# Patient Record
Sex: MALE | Race: AMERICAN INDIAN OR ALASKA NATIVE
[De-identification: names, ages, dates, MRNs, and addresses within clinical notes are randomized per-mention and may not be internally consistent; named-entity substitution may affect disease eponyms.]

---

## 2018-08-29 ENCOUNTER — HOSPITAL ENCOUNTER (OUTPATIENT)
Dept: HOSPITAL 31 - C.ER | Age: 48
Setting detail: OBSERVATION
LOS: 2 days | Discharge: LEFT BEFORE DISCHARGE | End: 2018-08-31
Payer: MEDICARE

## 2018-08-29 DIAGNOSIS — I69.954: ICD-10-CM

## 2018-08-29 DIAGNOSIS — E11.22: ICD-10-CM

## 2018-08-29 DIAGNOSIS — Z91.19: ICD-10-CM

## 2018-08-29 DIAGNOSIS — I12.0: Primary | ICD-10-CM

## 2018-08-29 DIAGNOSIS — N18.6: ICD-10-CM

## 2018-08-29 DIAGNOSIS — D64.9: ICD-10-CM

## 2018-08-29 DIAGNOSIS — K21.9: ICD-10-CM

## 2018-08-29 DIAGNOSIS — Z99.2: ICD-10-CM

## 2018-08-29 LAB
ALBUMIN SERPL-MCNC: 4.3 [, G/DL] (ref 3.5–5)
ALBUMIN/GLOB SERPL: 1.5 [,] (ref 1–2.1)
ALT SERPL-CCNC: 23 [, U/L] (ref 21–72)
AST SERPL-CCNC: 33 [, U/L] (ref 17–59)
BASOPHILS # BLD AUTO: 0 [, K/UL] (ref 0–0.2)
BASOPHILS NFR BLD: 0.9 [, %] (ref 0–2)
BUN SERPL-MCNC: 94 [, MG/DL] (ref 9–20)
CALCIUM SERPL-MCNC: 6.9 [, MG/DL] (ref 8.6–10.4)
EOSINOPHIL # BLD AUTO: 0.1 [, K/UL] (ref 0–0.7)
EOSINOPHIL NFR BLD: 2.5 [, %] (ref 0–4)
ERYTHROCYTE [DISTWIDTH] IN BLOOD BY AUTOMATED COUNT: 16.5 [, %] (ref 11.5–14.5)
GFR NON-AFRICAN AMERICAN: 3 [,]
HGB BLD-MCNC: 9.9 [, G/DL] (ref 12–18)
LYMPHOCYTES # BLD AUTO: 0.6 [, K/UL] (ref 1–4.3)
LYMPHOCYTES NFR BLD AUTO: 10.2 [, %] (ref 20–40)
MCH RBC QN AUTO: 31.5 [, PG] (ref 27–31)
MCHC RBC AUTO-ENTMCNC: 32.7 [, G/DL] (ref 33–37)
MCV RBC AUTO: 96.2 [, FL] (ref 80–94)
MONOCYTES # BLD: 0.4 [, K/UL] (ref 0–0.8)
MONOCYTES NFR BLD: 7.7 [, %] (ref 0–10)
NEUTROPHILS # BLD: 4.4 [, K/UL] (ref 1.8–7)
NEUTROPHILS NFR BLD AUTO: 78.7 [, %] (ref 50–75)
NRBC BLD AUTO-RTO: 0 [, %] (ref 0–2)
PLATELET # BLD: 176 [, K/UL] (ref 130–400)
PMV BLD AUTO: 8.4 [, FL] (ref 7.2–11.7)
RBC # BLD AUTO: 3.16 [, MIL/UL] (ref 4.4–5.9)
WBC # BLD AUTO: 5.7 [, K/UL] (ref 4.8–10.8)

## 2018-08-29 PROCEDURE — 80061 LIPID PANEL: CPT

## 2018-08-29 PROCEDURE — 83970 ASSAY OF PARATHORMONE: CPT

## 2018-08-29 PROCEDURE — 85025 COMPLETE CBC W/AUTO DIFF WBC: CPT

## 2018-08-29 PROCEDURE — 85027 COMPLETE CBC AUTOMATED: CPT

## 2018-08-29 PROCEDURE — 87040 BLOOD CULTURE FOR BACTERIA: CPT

## 2018-08-29 PROCEDURE — 80048 BASIC METABOLIC PNL TOTAL CA: CPT

## 2018-08-29 PROCEDURE — 36415 COLL VENOUS BLD VENIPUNCTURE: CPT

## 2018-08-29 PROCEDURE — 99285 EMERGENCY DEPT VISIT HI MDM: CPT

## 2018-08-29 PROCEDURE — 87340 HEPATITIS B SURFACE AG IA: CPT

## 2018-08-29 PROCEDURE — 71045 X-RAY EXAM CHEST 1 VIEW: CPT

## 2018-08-29 PROCEDURE — 84443 ASSAY THYROID STIM HORMONE: CPT

## 2018-08-29 PROCEDURE — 82607 VITAMIN B-12: CPT

## 2018-08-29 PROCEDURE — 82746 ASSAY OF FOLIC ACID SERUM: CPT

## 2018-08-29 PROCEDURE — 93971 EXTREMITY STUDY: CPT

## 2018-08-29 PROCEDURE — 85730 THROMBOPLASTIN TIME PARTIAL: CPT

## 2018-08-29 PROCEDURE — 83550 IRON BINDING TEST: CPT

## 2018-08-29 PROCEDURE — 71275 CT ANGIOGRAPHY CHEST: CPT

## 2018-08-29 PROCEDURE — 93005 ELECTROCARDIOGRAM TRACING: CPT

## 2018-08-29 PROCEDURE — 80053 COMPREHEN METABOLIC PANEL: CPT

## 2018-08-29 PROCEDURE — 80076 HEPATIC FUNCTION PANEL: CPT

## 2018-08-29 PROCEDURE — 83540 ASSAY OF IRON: CPT

## 2018-08-29 PROCEDURE — 84100 ASSAY OF PHOSPHORUS: CPT

## 2018-08-29 PROCEDURE — 36901 INTRO CATH DIALYSIS CIRCUIT: CPT

## 2018-08-29 PROCEDURE — 85610 PROTHROMBIN TIME: CPT

## 2018-08-29 PROCEDURE — 83036 HEMOGLOBIN GLYCOSYLATED A1C: CPT

## 2018-08-29 NOTE — C.PDOC
History Of Present Illness


47 y/o male with history of DM and HTN, on dialysis presents to ED with c/o 

left arm swelling radiating to left chest for 2 days. Patient state he missed 

dialysis yesterday and went to see Dr. Black today for medication refill and 

was sent to ED for further evaluation. Patient denies history of injury, sob, 

chest pain or any other complaints at this time.  


Time Seen by Provider: 08/29/18 16:58


Chief Complaint (Nursing): Upper Extremity Problem/Injury


History Per: Patient


History/Exam Limitations: no limitations


Onset/Duration Of Symptoms: Days


Current Symptoms Are (Timing): Still Present


Quality: Aching





Past Medical History


Reviewed: Historical Data, Nursing Documentation, Vital Signs


Vital Signs: 


 Last Vital Signs











Temp  98.1 F   08/29/18 16:46


 


Pulse  85   08/29/18 21:47


 


Resp  20   08/29/18 21:47


 


BP  176/98 H  08/29/18 21:47


 


Pulse Ox  98   08/29/18 21:47














- Medical History


PMH: Diabetes, HTN, End Stage Renal Disease


Other Surgeries: left AV fistula


Family History: States: No Known Family Hx





- Social History


Hx Alcohol Use: No


Hx Substance Use: No





- Immunization History


Hx Tetanus Toxoid Vaccination: No


Hx Influenza Vaccination: No


Hx Pneumococcal Vaccination: No





Review Of Systems


Constitutional: Negative for: Fever, Chills


Cardiovascular: Negative for: Chest Pain


Respiratory: Negative for: Cough, Shortness of Breath


Musculoskeletal: Positive for: Arm Pain (swelling), Other (Sweling left breast)


Skin: Negative for: Rash





Physical Exam





- Physical Exam


Appears: Non-toxic, No Acute Distress


Skin: Warm, Dry, No Rash


Head: Atraumatic, Normacephalic


Eye(s): bilateral: Normal Inspection


Oral Mucosa: Moist


Neck: Normal ROM, Other (venous engorgement to left side of neck)


Chest: No Ecchymosis, Other (left breast swelling)


Cardiovascular: Rhythm Regular


Respiratory: Normal Breath Sounds, No Rales, No Rhonchi, No Wheezing


Gastrointestinal/Abdominal: Soft, No Tenderness, No Guarding, No Rebound


Extremity: Capillary Refill (<2 seconds), No Deformity, Swelling (left arm 

diffusely), Other (fistula on left arm with +thrill)


Extremity: Bilateral: Normal ROM


Neurological/Psych: Oriented x3, Normal Motor, Normal Sensation





ED Course And Treatment





- Laboratory Results


Result Diagrams: 


 08/29/18 17:22





 08/29/18 17:22


Lab Interpretation: Abnormal (Hgb 9.9, BUN 94. Cr 15.2, K+ 4.8)


O2 Sat by Pulse Oximetry: 98 (RA)


Pulse Ox Interpretation: Normal





- CT Scan/US


  ** CTA Chest


Other Rad Studies (CT/US): Read By Radiologist, Radiology Report Reviewed


CT/US Interpretation: FINDINGS:  Pulmonary arteries: Unremarkable. No pulmonary 

embolism.  Aorta: No acute findings. No thoracic aortic aneurysm.  Other veins: 

Left brachiocephalic vein metallic stent. The right tip of the stent appears 

compressed.  The brachiocephalic vein is not clearly visualized distal to the 

stent.  Lungs: Mild interstitial infiltrates in each lung which may be due to 

edema.  Linear atelectasis or fibrosis left lung base.  No mass.  Pleural space

: Unremarkable. No significant effusion. No pneumothorax.  Heart: Coronary 

artery calcifications.  No significant pericardial effusion. No evidence of RV 

dysfunction.  Bones/joints: No acute fracture. No dislocation.  Soft tissues: 

Diffuse subcutaneous edema consistent with anasarca.  Lymph nodes: 

Unremarkable. No enlarged lymph nodes.  IMPRESSION:  1. Diffuse subcutaneous 

edema consistent with anasarca.  2. Left brachiocephalic vein metallic stent. 

The right tip of the stent appears compressed. The.  brachiocephalic vein is 

not clearly visualized distal to the stent. Cannot exclude occlusion of distal.

  brachiocephalic vein and a catheter venogram may be helpful if indicated.  3. 

Mild interstitial infiltrates in each lung which may be due to edema.





  ** CT Left Upper Extrem


Other Rad Studies (CT/US): Read By Radiologist, Radiology Report Reviewed


CT/US Interpretation: FINDINGS:  Bones/joints: Unremarkable. No acute fracture. 

No dislocation.  Soft tissues: Diffuse subcutaneous edema suggesting anasarca.  

Vasculature: Prominent veins in the left upper extremity consistent with patient

's known fistula.  These veins are patent where visualized. The small veins 

below the elbow are poorly visualized.  Atherosclerotic disease.  IMPRESSION:  

1. Diffuse subcutaneous edema suggesting anasarca.  2. Prominent veins in the 

left upper extremity consistent with patient's known fistula. These veins are.  

patent where visualized. The small veins below the elbow are poorly visualized.


Reevaluation Time: 19:46


Reassessment Condition: Unchanged





- Physician Consult Information


Outcome Of Conversation: 19:13  Paged Dr. Marcelo for nephrology consult. Patient 

will need dialysis today after CT scan is completed.  Dr Hollins agrees to admit 

the patient to her service for evaluation of left arm and chest wall swelling 

and possible DVT.





Disposition





- Disposition


Disposition: HOSPITALIZED


Disposition Time: 22:17


Condition: STABLE





- Clinical Impression


Clinical Impression: 


 ESRD (end stage renal disease) on dialysis, Anasarca








- Scribe Statement


The provider has reviewed the documentation as recorded by the Genevaibluanne Noel





All medical record entries made by the Brody were at my direction and 

personally dictated by me. I have reviewed the chart and agree that the record 

accurately reflects my personal performance of the history, physical exam, 

medical decision making, and the department course for this patient. I have 

also personally directed, reviewed, and agree with the discharge instructions 

and disposition.

## 2018-08-29 NOTE — RAD
Date of service: 



08/29/2018



PROCEDURE:  CHEST RADIOGRAPH, 1 VIEW



HISTORY:

SOB



COMPARISON:

None available.



FINDINGS:



LUNGS:

Prominence of the pulmonary vasculature may be secondary to AP 

technique and/or pulmonary vascular congestion. No focal 

consolidation.



PLEURA:

No pneumothorax or pleural fluid seen.



CARDIOVASCULAR:

Normal.



OSSEOUS STRUCTURES:

No significant abnormalities.



VISUALIZED UPPER ABDOMEN:

Normal.



OTHER FINDINGS:

Probable left brachiocephalic venous stent. 



IMPRESSION:

Prominence of the pulmonary vasculature may be secondary to AP 

technique and/or pulmonary vascular congestion. No focal 

consolidation or pleural effusion.

## 2018-08-30 LAB
% IRON SATURATION: 12 [,] (ref 20–55)
ALBUMIN SERPL-MCNC: 3.6 [, G/DL] (ref 3.5–5)
ALBUMIN/GLOB SERPL: 1.3 [,] (ref 1–2.1)
ALT SERPL-CCNC: 26 [, U/L] (ref 21–72)
APTT BLD: 34 [, SECONDS] (ref 21–34)
AST SERPL-CCNC: 21 [, U/L] (ref 17–59)
BILIRUB DIRECT SERPL-MCNC: 0.5 [, MG/DL] (ref 0–0.4)
BUN SERPL-MCNC: 60 [, MG/DL] (ref 9–20)
CALCIUM SERPL-MCNC: 7.3 [, MG/DL] (ref 8.6–10.4)
FOLATE SERPL-MCNC: 5.6 [, NG/ML]
GFR NON-AFRICAN AMERICAN: 5 [,]
HDLC SERPL-MCNC: 33 [, MG/DL] (ref 30–70)
INR PPP: 1.1 [,]
IRON SERPL-MCNC: 24 [, UG/DL] (ref 49–181)
LDLC SERPL-MCNC: 65 [, MG/DL] (ref 0–129)
PROTHROMBIN TIME: 11.8 [, SECONDS] (ref 9.7–12.2)
TIBC SERPL-MCNC: 200 [, UG/DL] (ref 250–450)
VIT B12 SERPL-MCNC: 577 [, PG/ML] (ref 239–931)

## 2018-08-30 NOTE — CT
Date of service: 



08/29/2018



PROCEDURE:  CT Chest and left upper extremity with contrast (CT 

venography)



HISTORY:

CT venogram for left arm and chest swelling



COMPARISON:

None available.



TECHNIQUE:

Axial computed tomography images were obtained of the chest and left 

upper extremity in the venous phase of enhancement. Coronal and 

sagittal reformatted images were created and reviewed.



Intravenous contrast dose: 100 mL Visipaque 320



Radiation dose:



Total exam DLP = 811.0 mGy-cm.



This CT exam was performed using one or more of the following dose 

reduction techniques: Automated exposure control, adjustment of the 

mA and/or kV according to patient size, and/or use of iterative 

reconstruction technique.



FINDINGS:



LUNGS:

Unremarkable. No nodule, mass or pulmonary consolidation. 



PLEURAL SPACES:

Unremarkable. No effusion or pneumothorax. 



HEART:

Cardiomegaly.  Coronary arterial and valvular calcification. No 

significant pericardial effusion. 



LYMPH NODES:

No lymphadenopathy.



BONES, CHEST WALL:

Diffuse anasarca. No fracture or destructive lesion 



OTHER FINDINGS:

Left upper extremity brachiocephalic fistula. Anastomosis difficult 

to evaluate. Juxta anastomotic outflow vein to indwelling 

brachiocephalic vein stent, widely patent.  Difficult to evaluate 

intra stent patency due to suboptimal opacification. Distal (medial) 

portion of stent appears compressed with limited assessment of the 

brachiocephalic vein distal to the stent. Arteries and veins in the 

forearm and hand are suboptimally assessed, but appear grossly 

patent. 



IMPRESSION:

Left upper extremity brachiocephalic arteriovenous fistula. The 

anastomosis is difficult to evaluate.  The juxta anastomotic outflow 

vein to the indwelling brachycephalic vein stent is widely patent.  

Difficult to evaluate intra stent patency due to suboptimal 

opacification. The distal (medial) portion of the stent appears 

compressed with limited assessment of the brachiocephalic vein distal 

to the stent. Conventional arteriovenous fistulography is recommended 

for better assessment.



Diffuse left upper extremity and left chest wall subcutaneous edema 

consistent with anasarca.

## 2018-08-30 NOTE — HP
date 8/29/18Copied To:  Brii Hollins MD

Attending MD:  Brii Hollins MD



CHIEF COMPLAINT:  Swelling of left upper extremity including left breast.



HISTORY OF PRESENT ILLNESS:  Mr. Paulino Pappas is a 48-year-old male with

history of diabetes mellitus, hypertension, renal insufficiency on

hemodialysis, came with complaining of left upper extremity pain radiating

to the left chest for two days.  The patient states that he missed the

dialysis yesterday and went to Dr. Black today for medication refill and

was presented to ED for further evaluation.  The patient denies history of

anxiety, shortness of breath, chest pain or any other complaints at this

time.  I am trying to get emergency dialysis for the patient this night. 

CAT scan is done by the ER.



PAST MEDICAL HISTORY:  Diabetes mellitus, hypertension, history of renal

disease on hemodialysis, left AV fistula.



FAMILY HISTORY:  Father and mother, noncontributory.



HABITS:  No smoking.  No drug.  No ethanol.



REVIEW OF SYSTEMS:  The patient was seen and examined at bedside.  In the

ER, having swelling of left upper extremity, left breast was swollen and

tender.  No fever.  No chills.  No headaches or dizziness.  No shortness of

breath.  Skin, negative for rash.



PHYSICAL EXAMINATION:

VITAL SIGNS:  Temperature 98.1, pulse 85, respiratory rate 20, blood

pressure 120/98, pulse oximetry 98.

HEENT:  Head, normocephalic and atraumatic.  Eyes, PERRLA.  Extraocular

muscles intact.  Conjunctivae clear.  Nose patent.

NECK:  Supple.  No carotid bruit.  No JVD or thyromegaly.

CHEST:  Bilaterally symmetrical.

HEART:  S1 and S2 positive.

LUNGS:  Clear to auscultation.

ABDOMEN:  Soft.  Bowel sounds present.  No organomegaly.

EXTREMITIES:  Left upper extremity swollen and other lower two extremities

have no swelling.  No cyanosis.

NEUROLOGIC:  The patient is awake and alert.  Follows simple commands.



LABORATORY DATA:  White blood cells 5.7, hemoglobin 9.9, hematocrit 30.4,

platelets 176.  Sodium 142, potassium 4.8, BUN 92, creatinine 15.2, glucose

113.



ASSESSMENT AND PLAN:  Mr. Paulino Pappas is a 48-year-old male with anemia;

acute on chronic renal insufficiency, on hemodialysis, noncompliant, missed

hemodialysis yesterday; history of diabetes mellitus; hypertension; left

arteriovenous fistula; anasarca, noncompliant, urged to be compliant.  Need

angiography.  CT results are pending.  Chest x-ray done, shows prominence

of the pulmonary vasculature, maybe secondary to technique and/or

pulmonary vascular congestion.   additional pleural effusion.  We

arranged immediate emergency dialysis.  The patient is scheduled for

hemodialysis.  As per the nurse, the patient is getting itchy.  I gave

Benadryl.  Blood pressure is high.  She is going to the nephrologist for

blood pressure medications.  Gastrointestinal and deep vein thrombosis

prophylaxis.  Repeat laboratories.  We will follow up.





__________________________________________

Brii Hollins MD





DD:  08/29/2018 23:50:00

DT:  08/30/2018 3:54:49

Job # [id number]

MTDD

## 2018-08-30 NOTE — CP.PCM.CON
History of Present Illness





- History of Present Illness


History of Present Illness: 





Surgery: Dr. Merritt





CC: Swelling LUE





HPI: 48M w. pmh of HTN, DM, ESRD presents with LUE and L side chest swelling x 

3 days. He has pain at the areas when pressure is applied, otherwise no 

complaints. He denies SOB, no CP/palpitations, no numbness/weakness/parethesias 

in the affected extremity. 





PMH: see above


PSH: LUE AVF


Meds: MAR reviewed


NKDA


Social: No ETOH/Tobacco/Drugs


Fhx: Non-contributory





Review of Systems





- Review of Systems


All systems: reviewed and no additional remarkable complaints except (HPI)





Past Patient History





- Infectious Disease


Hx of Infectious Diseases: None





- Past Social History


Smoking Status: Never Smoked





- CARDIAC


Hx Hypertension: Yes





- NEUROLOGICAL


HX Cerebrovascular Accident: Yes (6 yrs ago)


Hx Seizures: No





- RENAL


Hx Chronic Kidney Disease: Yes


Hx Dialysis: Yes (MWF via Lt arm AVF)


Hx Renal Failure: Yes





- ENDOCRINE/METABOLIC


Hx Diabetes Mellitus Type 2: Yes





- MUSCULOSKELETAL/RHEUMATOLOGICAL


Hx Falls: No





- PSYCHIATRIC


Hx Substance Use: No





Meds


Allergies/Adverse Reactions: 


 Allergies











Allergy/AdvReac Type Severity Reaction Status Date / Time


 


No Known Allergies Allergy   Verified 08/29/18 16:46














- Medications


Medications: 


 Current Medications





Calcium Acetate (Phoslo)  1,334 mg PO TIDCC [facility]


   Last Admin: 08/30/18 17:14 Dose:  1,334 mg


Heparin Sodium (Porcine) (Heparin)  5,000 units SC Q12 [facility]


   Last Admin: 08/30/18 13:15 Dose:  5,000 units


Vancomycin/Sodium Chloride (Vancomycin 1 Gm/Ns 200 Ml)  1 gm in 200 mls @ 

133.333 mls/hr IVPB TTS [facility]


   PRN Reason: Protocol


   Stop: 09/04/18 12:01


   Last Admin: 08/30/18 15:02 Dose:  133.333 mls/hr


Ferric Sodium Gluconate Complex 125 mg/ Sodium Chloride  110 mls @ 110 mls/hr 

IVPB TTS [facility]


   Stop: 09/11/18 10:59


   Last Admin: 08/30/18 17:15 Dose:  110 mls/hr


Oxycodone/Acetaminophen (Percocet 5/325 Mg Tab)  1 tab PO Q6H PRN


   PRN Reason: Pain 


   Stop: 09/02/18 15:37


   Last Admin: 08/30/18 17:14 Dose:  1 tab


Pneumococcal Polyvalent Vaccine (Pneumovax 23 Vaccine)  0.5 ml IM .ONCE ONE


   Stop: 08/31/18 10:01











Physical Exam





- Constitutional


Appears: Non-toxic, No Acute Distress





- Head Exam


Head Exam: ATRAUMATIC, NORMOCEPHALIC





- Eye Exam


Eye Exam: EOMI





- ENT Exam


ENT Exam: Mucous Membranes Moist





- Neck Exam


Neck exam: Positive for: Full Rom





- Respiratory Exam


Respiratory Exam: NORMAL BREATHING PATTERN.  absent: Accessory Muscle Use, 

Respiratory Distress





- Cardiovascular Exam


Additional comments: 





Swollen L chest wall





- GI/Abdominal Exam


GI & Abdominal Exam: Soft.  absent: Tenderness





- Extremities Exam


Additional comments: 





Swollen L bicep, sensation and motor fxn intact distally, AVF present w. thrill





- Neurological Exam


Neurological exam: Alert, Oriented x3





Results





- Vital Signs


Recent Vital Signs: 


 Last Vital Signs











Temp  98.3 F   08/30/18 15:30


 


Pulse  81   08/30/18 15:30


 


Resp  20   08/30/18 15:30


 


BP  197/91 H  08/30/18 15:30


 


Pulse Ox  98   08/30/18 15:30














- Labs


Result Diagrams: 


 08/29/18 17:22





 08/30/18 11:00


Labs: 


 Laboratory Results - last 24 hr











  08/29/18 08/29/18 08/30/18





  17:22 22:25 11:00


 


PT   


 


INR   


 


APTT   


 


Sodium  143  


 


Potassium  4.8  


 


Chloride  102  


 


Carbon Dioxide  25  


 


Anion Gap  20  


 


BUN  94 H  


 


Creatinine  15.2 H*  


 


Est GFR ( Amer)  4  


 


Est GFR (Non-Af Amer)  3  


 


Random Glucose  103  


 


Hemoglobin A1c   


 


Calcium  6.9 L  


 


Phosphorus   


 


Iron   


 


TIBC   


 


% Saturation   


 


Total Bilirubin  0.5   0.5


 


Direct Bilirubin    0.5 H


 


AST  33   21


 


ALT  23   26


 


Alkaline Phosphatase  77   75


 


Total Protein  7.3   6.3


 


Albumin  4.3   3.6


 


Globulin  2.9   2.7


 


Albumin/Globulin Ratio  1.5   1.3


 


Triglycerides    88


 


Cholesterol    132


 


LDL Cholesterol Direct    65


 


HDL Cholesterol    33


 


Vitamin B12    577


 


Folate    5.6


 


TSH 3rd Generation   


 


Hep Bs Antigen   Negative 














  08/30/18 08/30/18 08/30/18





  11:00 11:00 11:00


 


PT   


 


INR   


 


APTT   


 


Sodium    141


 


Potassium    4.1


 


Chloride    102


 


Carbon Dioxide    25


 


Anion Gap    18


 


BUN    60 H


 


Creatinine    10.3 H* D


 


Est GFR ( Amer)    7


 


Est GFR (Non-Af Amer)    5


 


Random Glucose    138 H


 


Hemoglobin A1c   5.9 


 


Calcium    7.3 L


 


Phosphorus    5.8 H


 


Iron  24 L  


 


TIBC  200 L  


 


% Saturation  12 L  


 


Total Bilirubin   


 


Direct Bilirubin   


 


AST   


 


ALT   


 


Alkaline Phosphatase   


 


Total Protein   


 


Albumin   


 


Globulin   


 


Albumin/Globulin Ratio   


 


Triglycerides   


 


Cholesterol   


 


LDL Cholesterol Direct   


 


HDL Cholesterol   


 


Vitamin B12   


 


Folate   


 


TSH 3rd Generation    4.86 H


 


Hep Bs Antigen   














  08/30/18





  11:00


 


PT  11.8


 


INR  1.1


 


APTT  34


 


Sodium 


 


Potassium 


 


Chloride 


 


Carbon Dioxide 


 


Anion Gap 


 


BUN 


 


Creatinine 


 


Est GFR ( Amer) 


 


Est GFR (Non-Af Amer) 


 


Random Glucose 


 


Hemoglobin A1c 


 


Calcium 


 


Phosphorus 


 


Iron 


 


TIBC 


 


% Saturation 


 


Total Bilirubin 


 


Direct Bilirubin 


 


AST 


 


ALT 


 


Alkaline Phosphatase 


 


Total Protein 


 


Albumin 


 


Globulin 


 


Albumin/Globulin Ratio 


 


Triglycerides 


 


Cholesterol 


 


LDL Cholesterol Direct 


 


HDL Cholesterol 


 


Vitamin B12 


 


Folate 


 


TSH 3rd Generation 


 


Hep Bs Antigen 














- Imaging and Cardiology


  ** CT scan - chest


Status: Image reviewed by me, Report reviewed by me





Assessment & Plan





- Assessment and Plan (Free Text)


Assessment: 





48M w. swollen LUE likely 2/2 central stenosis


-Fistulogram tomorrow


-NPO at midnight


-AM labs


-consent in chart


-d/w attending





Marcia PGY4

## 2018-08-30 NOTE — CP.PCM.CON
History of Present Illness





- History of Present Illness


History of Present Illness: 





49 y/o male with ESRD on maintenance hemodialysis every MWF was admitted last 

night for c/o progresive swelling of Lt arm & chest for the past few days..


Pt saw his Dr yesterday for renewal of pain meds  & was eferred to ER for 

further evluation.


Pt receives dialysis in a [facility] in [place] & his Nephrologist 

iis Dr Stone who does not come here.


Pts PMH is also significant for HTN, CVA with Rt hemiparesis 6 yrs ago, Hx/o DM 

was on Metformin  prior to dialysis .


Has been on dialysis for 6 yrs. Takes Clonidine PRN & Phoslo with meals  FHx  

is  significant for DM,HTN but no kidney Dis.





Review of Systems





- Review of Systems


Review of Systems: 





Appears comfortable supine. No SOB





- Constitutional


Constitutional: As Per HPI





Past Patient History





- Infectious Disease


Hx of Infectious Diseases: None





- Past Social History


Smoking Status: Never Smoked





- CARDIAC


Hx Hypertension: Yes





- NEUROLOGICAL


HX Cerebrovascular Accident: Yes (6 yrs ago)


Hx Seizures: No





- RENAL


Hx Chronic Kidney Disease: Yes


Hx Dialysis: Yes (MWF via Lt arm AVF)


Hx Renal Failure: Yes





- ENDOCRINE/METABOLIC


Hx Diabetes Mellitus Type 2: Yes





- MUSCULOSKELETAL/RHEUMATOLOGICAL


Hx Falls: No





- PSYCHIATRIC


Hx Substance Use: No





Meds


Allergies/Adverse Reactions: 


 Allergies











Allergy/AdvReac Type Severity Reaction Status Date / Time


 


No Known Allergies Allergy   Verified 08/29/18 16:46














Physical Exam





- Constitutional


Appears: No Acute Distress


Additional comments: 





pleasant & cooperative





- Head Exam


Head Exam: ATRAUMATIC, NORMOCEPHALIC


Additional comments: 





No facial edema





- Eye Exam


Additional comments: 





Conjunctivae pale Sclera anicteric





- ENT Exam


ENT Exam: Mucous Membranes Moist


Additional comments: 





No krystal swelling





- Neck Exam


Neck exam: Positive for: Normal Inspection


Additional comments: 





Neck supple





- Respiratory Exam


Respiratory Exam: Wheezes (Lungs clear. No wheezes), NORMAL BREATHING PATTERN


Additional comments: 





Lungs clear No wheezes





- Cardiovascular Exam


Cardiovascular Exam: REGULAR RHYTHM


Additional comments: 





No rub or S3


Massive edema of Lt chest & Lt breast





- GI/Abdominal Exam


GI & Abdominal Exam: Soft


Additional comments: 





No tenderness





- Extremities Exam


Additional comments: 





Massive edema of Lt arm. N redness or warmth around AVF. + bruit


No pedal edema. PP palp.





- Neurological Exam


Neurological exam: Abnormal Gait


Additional comments: 





Mild weakness of Rt leg





Results





- Vital Signs


Recent Vital Signs: 


 Last Vital Signs











Temp  98.6 F   08/30/18 08:44


 


Pulse  50 L  08/30/18 08:44


 


Resp  20   08/30/18 08:44


 


BP  195/58 H  08/30/18 08:44


 


Pulse Ox  96   08/30/18 08:44














- Labs


Result Diagrams: 


 08/29/18 17:22





 08/29/18 17:22


Labs: 


 Laboratory Results - last 24 hr











  08/29/18 08/29/18 08/29/18





  17:22 17:22 22:25


 


WBC  5.7  


 


RBC  3.16 L  


 


Hgb  9.9 L  


 


Hct  30.4 L  


 


MCV  96.2 H  


 


MCH  31.5 H  


 


MCHC  32.7 L  


 


RDW  16.5 H  


 


Plt Count  176  


 


MPV  8.4  


 


Neut % (Auto)  78.7 H  


 


Lymph % (Auto)  10.2 L  


 


Mono % (Auto)  7.7  


 


Eos % (Auto)  2.5  


 


Baso % (Auto)  0.9  


 


Neut # (Auto)  4.4  


 


Lymph # (Auto)  0.6 L  


 


Mono # (Auto)  0.4  


 


Eos # (Auto)  0.1  


 


Baso # (Auto)  0.0  


 


Sodium   143 


 


Potassium   4.8 


 


Chloride   102 


 


Carbon Dioxide   25 


 


Anion Gap   20 


 


BUN   94 H 


 


Creatinine   15.2 H* 


 


Est GFR ( Amer)   4 


 


Est GFR (Non-Af Amer)   3 


 


Random Glucose   103 


 


Calcium   6.9 L 


 


Total Bilirubin   0.5 


 


AST   33 


 


ALT   23 


 


Alkaline Phosphatase   77 


 


Total Protein   7.3 


 


Albumin   4.3 


 


Globulin   2.9 


 


Albumin/Globulin Ratio   1.5 


 


Hep Bs Antigen    Negative














Assessment & Plan





- Assessment and Plan (Free Text)


Assessment: 





ESRD dialysis dependent


Lt arm & chest edema r/o stenosis of central veins/ lymphatic obstruction


HTN


Hx/o Lt CVA with Rt hannah


Plan: 





Pt had CT of chest with IV contrast last  night & had reeived short dialysis 

last night.


Pt is scheduled for dialysis today Then continue HD schedule of MWF


Monitor BP & adjust meds


Phos ,intact PTH

## 2018-08-31 VITALS — HEART RATE: 73 BPM | SYSTOLIC BLOOD PRESSURE: 132 MMHG | TEMPERATURE: 97.7 F | DIASTOLIC BLOOD PRESSURE: 89 MMHG

## 2018-08-31 VITALS — RESPIRATION RATE: 18 BRPM

## 2018-08-31 VITALS — OXYGEN SATURATION: 100 %

## 2018-08-31 LAB
ALBUMIN SERPL-MCNC: 3.6 [, G/DL] (ref 3.5–5)
ALBUMIN/GLOB SERPL: 1.2 [,] (ref 1–2.1)
ALT SERPL-CCNC: 16 [, U/L] (ref 21–72)
AST SERPL-CCNC: 20 [, U/L] (ref 17–59)
BUN SERPL-MCNC: 37 [, MG/DL] (ref 9–20)
CALCIUM SERPL-MCNC: 8 [, MG/DL] (ref 8.6–10.4)
ERYTHROCYTE [DISTWIDTH] IN BLOOD BY AUTOMATED COUNT: 16.2 [, %] (ref 11.5–14.5)
GFR NON-AFRICAN AMERICAN: 8 [,]
HGB BLD-MCNC: 10.7 [, G/DL] (ref 12–18)
MCH RBC QN AUTO: 32.3 [, PG] (ref 27–31)
MCHC RBC AUTO-ENTMCNC: 33.8 [, G/DL] (ref 33–37)
MCV RBC AUTO: 95.8 [, FL] (ref 80–94)
PLATELET # BLD: 170 [, K/UL] (ref 130–400)
PMV BLD AUTO: 9.4 [, FL] (ref 7.2–11.7)
RBC # BLD AUTO: 3.31 [, MIL/UL] (ref 4.4–5.9)
WBC # BLD AUTO: 4.1 [, K/UL] (ref 4.8–10.8)

## 2018-08-31 NOTE — PN
Copied To:  Brii Hollins MD

Attending MD:  Brii Hollins MD



DATE:  08/30/2018



SUBJECTIVE:  The patient is seen and examined at the bedside in the

dialysis center, getting dialysis, sleepy, arousable, moving all 4

extremities.  No focal deficit.  Swelling of the left upper extremity and

left breast is a little bit better.  No fever.  No chills.  No shortness of

breath.



PHYSICAL EXAMINATION:

VITAL SIGNS:  Temperature 98.5, pulse 50, respiratory rate 20, blood

pressure 195/58, pulse oximetry 96.

HEENT:  Head, normocephalic and atraumatic.  Eyes, PERRLA.  Extraocular

muscles intact.  Conjunctivae clear.  Nose patent.  Mucous membranes are

moist.

NECK:  Supple.  No carotid bruit, JVD, or thyromegaly.

CHEST:  Bilaterally symmetrical.

HEART:  S1 and S2 positive.

LUNGS:  Clear to auscultation.

ABDOMEN:  Soft.  Bowel sounds present.  No organomegaly.

EXTREMITIES:  Left upper extremity swollen; and lower both extremities, no

edema, no cyanosis.

NEUROLOGIC:  The patient is sleepy, arousable, moving all 4 extremities. 

No focal deficit.



LABORATORY DATA:  White blood cell 5.7, hemoglobin 9.9, hematocrit 30.4,

platelets 176.  Sodium 140, potassium 4.8, BUN 94, creatinine 15.2, glucose

113.



MEDICATIONS:  Reviewed by me.



ASSESSMENT AND PLAN:  Mr. Paulino Pappas is a 48-year-old male with end-stage

renal disease, on hemodialysis  dependent chest edema, rule out

stenosis of the central vein,  obstruction, hypertension, history of

left cerebrovascular accident with right hemiplegia.  The patient had CAT

of the chest with IV contrast last night and had received short dialysis

last night and getting full dialysis today.  Hemodialysis is on Monday,

Wednesday, and Friday.  The patient has high blood pressure, was started on

losartan, nephrologist gave clonidine.  We will check labs.  The patient

was seen by pain management yesterday, and sent to the emergency room for

evaluation of left upper extremity and left chest swelling.  Normally, the

patient receives dialysis at [facility] in [place]  The

patient has history of diabetes mellitus, was on metformin prior to his

dialysis.  He is getting dialysis almost 6 years.  I reviewed Dr. Evi Marcelo's notes, angio CT done.  The patient will be n.p.o. after midnight. 

We will go for procedure by Dr. Shaina ji.  Repeat labs.  We

will follow up.





__________________________________________

Brii Hollins MD



DD:  08/30/2018 23:27:59

DT:  08/31/2018 1:44:10

Job # [id number]

MATT

## 2018-08-31 NOTE — CARD
--------------- APPROVED REPORT --------------





Date of service: 08/30/2018



EKG Measurement

Heart [id number]

FL [id number]

[id number]

[id number]

[id number]



<Conclusion>

Normal sinus rhythm

Anteroseptal infarct, age undetermined

T wave abnormality, consider lateral ischemia

Abnormal ECG

## 2018-08-31 NOTE — PCM.SURG1
Surgeon's Initial Post Op Note





- Surgeon's Notes


Surgeon: mau


Assistant: 0


Type of Anesthesia: IV Sedation


Anesthesia Administered By: olga


Pre-Operative Diagnosis: renal failure


Operative Findings: left innominate vein to svc junction occluded.  previous 

stent in left innominate vein.  rest of fistula widely patent.  no intervention 

completed


Post-Operative Diagnosis: same


Operation Performed: fistulagram left arm


Specimen/Specimens Removed: 0


Estimated Blood Loss: EBL {In ML}: 5


Blood Products Given: N/A


Drains Used: No Drains


Post-Op Condition: Good


Date of Surgery/Procedure: 08/31/18


Time of Surgery/Procedure: 15:49

## 2018-08-31 NOTE — CP.PCM.PN
Subjective





- Date & Time of Evaluation


Date of Evaluation: 08/31/18


Time of Evaluation: 01:00





- Subjective


Subjective: 





Pt is currently in OR for fistulagram





Objective





- Vital Signs/Intake and Output


Vital Signs (last 24 hours): 


 











Temp Pulse Resp BP Pulse Ox


 


 98.3 F   81   20   170/89 H  98 


 


 08/30/18 15:30  08/31/18 11:44  08/30/18 21:00  08/31/18 10:56  08/30/18 15:30











- Medications


Medications: 


 Current Medications





Amlodipine Besylate (Norvasc)  10 mg PO DAILY [facility]


   Last Admin: 08/31/18 09:07 Dose:  10 mg


Calcium Acetate (Phoslo)  1,334 mg PO TIDCC [facility]


   Last Admin: 08/31/18 11:51 Dose:  Not Given


Clonidine HCl (Catapres)  0.3 mg PO BID [facility]


   Last Admin: 08/31/18 09:08 Dose:  0.3 mg


Heparin Sodium (Porcine) (Heparin)  5,000 units SC Q12 [facility]


   Last Admin: 08/31/18 09:19 Dose:  Not Given


Vancomycin/Sodium Chloride (Vancomycin 1 Gm/Ns 200 Ml)  1 gm in 200 mls @ 

133.333 mls/hr IVPB TTS [facility]


   PRN Reason: Protocol


   Stop: 09/04/18 12:01


   Last Admin: 08/30/18 15:02 Dose:  133.333 mls/hr


Ferric Sodium Gluconate Complex 125 mg/ Sodium Chloride  110 mls @ 110 mls/hr 

IVPB TTS [facility]


   Stop: 09/11/18 10:59


   Last Admin: 08/30/18 17:15 Dose:  110 mls/hr


Losartan Potassium (Cozaar)  100 mg PO DAILY [facility]


   Last Admin: 08/31/18 09:07 Dose:  100 mg


Oxycodone/Acetaminophen (Percocet 5/325 Mg Tab)  1 tab PO Q6H [facility]


   PRN Reason: Pain 


   Stop: 09/02/18 15:37


   Last Admin: 08/30/18 17:14 Dose:  1 tab


Paricalcitol (Zemplar)  2 mcg IV TTS [facility]











- Labs


Labs: 


 





 08/31/18 07:58 





 08/31/18 07:58 





 











PT  11.8 SECONDS (9.7-12.2)   08/30/18  11:00    


 


INR  1.1   08/30/18  11:00    


 


APTT  34 SECONDS (21-34)   08/30/18  11:00    














Assessment and Plan





- Assessment and Plan (Free Text)


Assessment: 





ESRD


SHPT


AVF malfunction


HTN


Anemia


Plan: 





Await report of fistulagram


Add Zemplar for SHPT


Repeat CBC


BP is better controlled

## 2018-08-31 NOTE — CP.PCM.PN
Subjective





- Date & Time of Evaluation


Date of Evaluation: 08/31/18


Time of Evaluation: 01:20





- Subjective


Subjective: 





Pt just returned from OR


Appears comfortable





Objective





- Vital Signs/Intake and Output


Vital Signs (last 24 hours): 


 











Temp Pulse Resp BP Pulse Ox


 


 98.3 F   81   20   170/89 H  98 


 


 08/30/18 15:30  08/31/18 11:44  08/30/18 21:00  08/31/18 10:56  08/30/18 15:30











- Medications


Medications: 


 Current Medications





Amlodipine Besylate (Norvasc)  10 mg PO DAILY [facility]


   Last Admin: 08/31/18 09:07 Dose:  10 mg


Calcium Acetate (Phoslo)  1,334 mg PO TIDCC [facility]


   Last Admin: 08/31/18 11:51 Dose:  Not Given


Clonidine HCl (Catapres)  0.3 mg PO BID [facility]


   Last Admin: 08/31/18 09:08 Dose:  0.3 mg


Heparin Sodium (Porcine) (Heparin)  5,000 units SC Q12 [facility]


   Last Admin: 08/31/18 09:19 Dose:  Not Given


Vancomycin/Sodium Chloride (Vancomycin 1 Gm/Ns 200 Ml)  1 gm in 200 mls @ 

133.333 mls/hr IVPB TTS [facility]


   PRN Reason: Protocol


   Stop: 09/04/18 12:01


   Last Admin: 08/30/18 15:02 Dose:  133.333 mls/hr


Ferric Sodium Gluconate Complex 125 mg/ Sodium Chloride  110 mls @ 110 mls/hr 

IVPB TTS [facility]


   Stop: 09/11/18 10:59


   Last Admin: 08/30/18 17:15 Dose:  110 mls/hr


Losartan Potassium (Cozaar)  100 mg PO DAILY [facility]


   Last Admin: 08/31/18 09:07 Dose:  100 mg


Oxycodone/Acetaminophen (Percocet 5/325 Mg Tab)  1 tab PO Q6H PRN


   PRN Reason: Pain 


   Stop: 09/02/18 15:37


   Last Admin: 08/30/18 17:14 Dose:  1 tab


Paricalcitol (Zemplar)  2 mcg IV TTS [facility]











- Labs


Labs: 


 





 08/31/18 07:58 





 08/31/18 07:58 





 











PT  11.8 SECONDS (9.7-12.2)   08/30/18  11:00    


 


INR  1.1   08/30/18  11:00    


 


APTT  34 SECONDS (21-34)   08/30/18  11:00    














- Eye Exam


Eye Exam: Normal appearance


Additional comments: 





Conjunctivae pale No icterus





- ENT Exam


ENT Exam: Mucous Membranes Moist





- Neck Exam


Additional comments: 





JVD negative





- Respiratory Exam


Respiratory Exam: NORMAL BREATHING PATTERN


Additional comments: 





Lungs clear





- Cardiovascular Exam


Cardiovascular Exam: REGULAR RHYTHM


Additional comments: 


RR S1,S2 normal





- GI/Abdominal Exam


GI & Abdominal Exam: Soft





- Extremities Exam


Additional comments: 





Marked decrease in Lt arm edema. + bruit


No pedal edema





Assessment and Plan





- Assessment and Plan (Free Text)


Assessment: 





ESRD on HD


SHPT


Malfunction of Lt arm AVF.


HTN


Anemia


Plan: 





Awaiting  Fistulagram report


BP is better controlled


Add Zemplar with  HD


Iron profile was reviewed. Ferrlecit is added


Repeat CBC

## 2018-08-31 NOTE — VASCLAB
Date of service: 



08/31/2018



PROCEDURE:  Left Upper Extremity Venous Duplex Exam



HISTORY:

swelling left arm and chest wall 



PRIORS:

None. 



TECHNIQUE:

Left upper extremity, internal jugular, subclavian, axillary, 

brachial, ulnar, radial, basilic and upper cephalic veins were 

evaluated. Flow was assessed with color Doppler, compressibility, 

assessment of phasic flow and augmentation response.



Report prepared by   Juan J Donahue, T 



FINDINGS:



LEFT:

1. Internal Jugular: 



1.1. Compressibility - Fully compressible: Thrombus - None : Flow - 

Phasic: Augmentation -Normal: Reflux - .



2. Subclavian:



2.1. Compressibility - Fully compressible: Thrombus - None : Flow - 

Phasic: Augmentation -Normal: Reflux - .



3. Axillary: 



3.1. Compressibility - Fully compressible: Thrombus - None : Flow - 

Phasic: Augmentation -Normal: Reflux - .



4. Brachial: 



4.1. Compressibility - Fully compressible: Thrombus - None: Flow - 

Phasic: Augmentation -Normal: Reflux - .



5. Ulnar:



5.1. Compressibility - Fully compressible: Thrombus - None: Flow - : 

Augmentation -: Reflux - .



6. Radial:



6.1. Compressibility - Fully compressible: Thrombus - None: Flow - : 

Augmentation - : Reflux - .



7. Cephalic:



7.1. Compressibility - Fully compressible: Thrombus - None: Flow - 

Pulsatile: Augmentation -: Reflux - .



8. Basilic:

8.1. Compressibility - Fully compressible: Thrombus - None: Flow - : 

Augmentation -: Reflux - .





OTHER FINDINGS:  Left: There was a patent and functional 

brachio-cephalic AVF noted.



IMPRESSION:

Left: 



No evidence of vein thrombosis of the left upper extremity with 

excellent venous flow.     



Normal venous flow noted in the right internal jugular and right 

subclavian veins.

## 2018-09-01 NOTE — VAS
Copied To:  Jacob Merritt Jr., MD

Attending MD:  Jacob Merritt Jr., MD



DATE:  08/31/2018



PREOPERATIVE DIAGNOSES:  Renal failure, left arm swelling, central vein

stenosis.



PROCEDURE CARRIED OUT:  _____ fistulogram left arm.



SURGEON:  Jacob Merritt Jr., MD



ASSISTANT:  None.



ANESTHESIOLOGIST:  Carmela Fulton CRNA



ANESTHESIA:  Local with sedation.



INDICATIONS:  The patient is a middle-aged man with renal insufficiency,

has swollen left arm.



OPERATIVE FINDINGS:  A fistulogram was carried out in the left arm using

micropuncture technique with puncture to the left arm fistula.  We advanced

the catheter centrally, carried out fistulogram.  The central vein,

however, there was a previously placed stent in the left innominate vein. 

The stent is occluded at its distal segment close to the heart.  The

proximal portion lies across the vessel making it quite difficult to

cannulate.  The rest of the fistula does not show any significant _____

widely patent, more proximal portion closer to arterial anastomosis does

show aneurysmal degeneration, but the arterial anastomosis was widely

patent without any arterial stenosis.



PROCEDURE AND FINDINGS:  Subsequent to the performance of the diagnostic

arteriogram, a stiff-angled guidewire was advanced to the level of the

occlusion and attempts were made using variety of catheters including a RIM

catheter, LIMA catheter to cannulate this.  There were all unsuccessful. 

Attempts were made occasionally where we were able to cross a closet where

inside the mesh of the stent and thus we abandoned further attempts of

doing it.  In addition, the distal portion of stent was fully occluded. 

Attempts should be made perhaps from the leg, but I think it may be

worthwhile for the patient to go to the institution where this was carried

out initially in an attempt to remedy.





__________________________________________

Jacob Merritt Jr., MD





cc:  Evi Wiley





DD:  08/31/2018 15:55:41

DT:  09/01/2018 1:25:25

Job # [id number]